# Patient Record
Sex: FEMALE | Race: WHITE | NOT HISPANIC OR LATINO | Employment: FULL TIME | URBAN - METROPOLITAN AREA
[De-identification: names, ages, dates, MRNs, and addresses within clinical notes are randomized per-mention and may not be internally consistent; named-entity substitution may affect disease eponyms.]

---

## 2017-02-20 ENCOUNTER — TELEPHONE (OUTPATIENT)
Dept: NEUROLOGY | Facility: CLINIC | Age: 52
End: 2017-02-20

## 2017-02-20 NOTE — TELEPHONE ENCOUNTER
Writer received message:  Caller: Annia     Relationship to Patient: Self     Call Back Number: [phone]     Reason for Call: Patient took her am medication and she is feeling auras. She was wondering if she should take additional medication or if she should wait to take her second dose. She would like a call back to discuss the matter. Thanks       Writer called and spoke to patient who indicates that she has started to have an aura. She also explains although she has not had an mensis in four months, she had one in feb and is this morning beginning to have small spotting.   She further indicates that this was the trigger for her auras for many years although she has been seizure free for a long time now.    Writer suggested that she take one of her 250 mg ER Depakote and cut that in half to expose the inside for immediate absorption and take it now.   Information forwarded to Dr. Hawkins for his assessment.    Juwan Sparks

## 2017-02-21 NOTE — TELEPHONE ENCOUNTER
Writer made phone call to patient to see how she is doing today, and she reports that she is doing very well and that there are no more issues at this time.    Juwan Sparks RN

## 2017-09-15 ENCOUNTER — OFFICE VISIT (OUTPATIENT)
Dept: NEUROLOGY | Facility: CLINIC | Age: 52
End: 2017-09-15

## 2017-09-15 VITALS
DIASTOLIC BLOOD PRESSURE: 80 MMHG | BODY MASS INDEX: 32.12 KG/M2 | SYSTOLIC BLOOD PRESSURE: 132 MMHG | WEIGHT: 192.8 LBS | HEART RATE: 110 BPM | HEIGHT: 65 IN

## 2017-09-15 DIAGNOSIS — G40.309 GENERALIZED CONVULSIVE EPILEPSY (H): Primary | ICD-10-CM

## 2017-09-15 LAB — VALPROATE SERPL-MCNC: 103 MG/L (ref 50–100)

## 2017-09-15 RX ORDER — DIVALPROEX SODIUM 250 MG
TABLET, EXTENDED RELEASE 24 HR ORAL
Qty: 360 TABLET | Refills: 4 | Status: SHIPPED | OUTPATIENT
Start: 2017-09-15 | End: 2018-08-28

## 2017-09-15 NOTE — PROGRESS NOTES
"   INTERVAL HX: No sz no side effects except rare tremor.  SEIZURE HISTORY REVIEWED 9/15/17:  She has been at [facility] since 2008.   Seizure onset at age 5.  She has had generalized tonic-clonic seizures and absence eye fluttering seizures.  In the past she has been treated with phenobarbital and Dilantin, but these were not very effective. Ever since we started VPA she has been sz free. Family history is that brother had seizures.    She has not had an MRI in our program.      EEG 08/2012 indicated frequent interictal activity, mostly generalized, and frequent brief episodes of eye flutter which were clinically not detected but were seen on video.  She continues to be right at the edge of having seizures.      She is still menstruating and has not gone into menopause yet.      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 Marriage is stable.  REVIEW OF SYSTEMS:  Except for L knee pain completely negative in 13 points.      EXAMINATION: Blood pressure 132/80, pulse 110, height 5' 4.96\" (165 cm), weight 192 lb 12.8 oz (87.5 kg), not currently breastfeeding.   She is cheerful, alert, well groomed and very polite.      Speech is very fluent and articulate with no difficulty with word finding.  Indeed, she even corrected my grammar at one point.      Extraocular movements are full.  Pupils react briskly.  Facial movements are symmetrical.  Hearing is intact.  Tongue and palate move normally.        Cerebellar:  There is mild tremor of outstretched hands.  Finger-nose-finger shows mild tremor, right more than left.  Tandem gait normal.        Motor tone, strength and coordination are all normal.   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  " Nevertheless her EEG is fairly typical. She failed generic in past; brand medically necessary. 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 She is still having probably some subclinical eye fluttering events, so we do not have room to lower her medication.      We had a long discussion regarding menopause, estrogen use, etc.     PLAN:        1.  Continue Brand Depakote 250   Two bid    2.  Return in 1 year.  3. VPA level and genotyping

## 2017-09-15 NOTE — LETTER
"9/15/2017       RE: Annia Eng  : 1965   MRN: [id number]      Dear Colleague,    Thank you for referring your patient, Annia Eng, to the [facility] EPILEPSY CARE at [facility]. Please see a copy of my visit note below.       INTERVAL HX: No sz no side effects except rare tremor.  SEIZURE HISTORY REVIEWED 9/15/17:  She has been at [facility] since .   Seizure onset at age 5.  She has had generalized tonic-clonic seizures and absence eye fluttering seizures.  In the past she has been treated with phenobarbital and Dilantin, but these were not very effective. Ever since we started VPA she has been sz free. Family history is that brother had seizures.    She has not had an MRI in our program.      EEG 2012 indicated frequent interictal activity, mostly generalized, and frequent brief episodes of eye flutter which were clinically not detected but were seen on video.  She continues to be right at the edge of having seizures.      She is still menstruating and has not gone into menopause yet.      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 Marriage is stable.  REVIEW OF SYSTEMS:  Except for L knee pain completely negative in 13 points.      EXAMINATION: Blood pressure 132/80, pulse 110, height 5' 4.96\" (165 cm), weight 192 lb 12.8 oz (87.5 kg), not currently breastfeeding.   She is cheerful, alert, well groomed and very polite.      Speech is very fluent and articulate with no difficulty with word finding.  Indeed, she even corrected my grammar at one point.      Extraocular movements are full.  Pupils react briskly.  Facial movements are symmetrical.  Hearing is intact.  Tongue and palate move normally.        Cerebellar:  There is mild tremor of outstretched hands.  Finger-nose-finger shows mild tremor, right more than left.  Tandem gait normal.        Motor tone, strength and coordination are all normal.   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  Nevertheless her EEG is fairly typical. She failed generic in past; brand medically necessary. 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 She is still having probably some subclinical eye fluttering events, so we do not have room to lower her medication.      We had a long discussion regarding menopause, estrogen use, etc.     PLAN:        1.  Continue Brand Depakote 250   Two bid    2.  Return in 1 year.  3. VPA level and genotyping         Jony Hawkins MD

## 2017-09-15 NOTE — MR AVS SNAPSHOT
"              After Visit Summary   9/15/2017    Annia Eng    MRN: [id number]           Patient Information     Date Of Birth          1965        Visit Information        Provider Department      9/15/2017 9:00 AM Jony Hawkins MD [facility]        Today's Diagnoses     Generalized convulsive epilepsy (H)    -  1       Follow-ups after your visit        Follow-up notes from your care team     Return in about 1 year (around 9/15/2018).      Who to contact     Please call your clinic at [phone] to:    Ask questions about your health    Make or cancel appointments    Discuss your medicines    Learn about your test results    Speak to your doctor   If you have compliments or concerns about an experience at your clinic, or if you wish to file a complaint, please contact [facility] Patient Relations at [phone] or email us at [email or web address]         Additional Information About Your Visit        MyChart Information     [employer] gives you secure access to your electronic health record. If you see a primary care provider, you can also send messages to your care team and make appointments. If you have questions, please call your primary care clinic.  If you do not have a primary care provider, please call [phone] and they will assist you.      [employer] is an electronic gateway that provides easy, online access to your medical records. With [employer], you can request a clinic appointment, read your test results, renew a prescription or communicate with your care team.     To access your existing account, please contact your [facility] or call [phone] for assistance.        Care EveryWhere ID     This is your Care EveryWhere ID. This could be used by other organizations to access your [place] medical records  [id number]        Your Vitals Were     Pulse Height BMI (Body Mass Index)             110 5' 4.96\" (165 cm) " 32.12 kg/m2          Blood Pressure from Last 3 Encounters:   09/15/17 132/80   10/14/16 144/90   10/05/15 142/67    Weight from Last 3 Encounters:   09/15/17 192 lb 12.8 oz (87.5 kg)   10/14/16 185 lb 3.2 oz (84 kg)   10/05/15 183 lb (83 kg)              We Performed the Following     Valproic Acid level          Where to get your medicines      These medications were sent to [place] [address] [facility] - [facility] [address]  [address] [address]     Phone:  [phone]     DEPAKOTE  MG 24 hr tablet          Primary Care Provider Office Phone # Fax #    Talita Joel [phone]       [facility] [address] [facility] [address]        Equal Access to Services     [facility]: Hadii fly rose hadasho Soomaali, waaxda luqadaha, qaybta kaalmada adeegyada, dagoberto kulkarni hayja trimble . So [facility] [phone].    ATENCIÓN: Si habla español, tiene a mccain disposición servicios gratuitos de asistencia lingüística. Matt al [phone].    We comply with applicable federal civil rights laws and [place] laws. We do not discriminate on the basis of race, color, national origin, age, disability sex, sexual orientation or gender identity.            Thank you!     Thank you for choosing [facility] EPILEPS[facility]  for your care. Our goal is always to provide you with excellent care. Hearing back from our patients is one way we can continue to improve our services. Please take a few minutes to complete the written survey that you may receive in the mail after your visit with us. Thank you!             Your Updated Medication List - Protect others around you: Learn how to safely use, store and throw away your medicines at www.disposemymeds.org.          This list is accurate as of: 9/15/17  9:09 AM.  Always use your most recent med list.                   Brand Name Dispense Instructions for use Diagnosis    DEPAKOTE  MG 24 hr tablet   Generic  drug:  divalproex     360 tablet    TAKE TWO TABLETS BY MOUTH TWICE DAILY    Generalized convulsive epilepsy (H)

## 2017-11-26 ENCOUNTER — HEALTH MAINTENANCE LETTER (OUTPATIENT)
Age: 52
End: 2017-11-26

## 2018-03-15 ENCOUNTER — TELEPHONE (OUTPATIENT)
Dept: NEUROLOGY | Facility: CLINIC | Age: 53
End: 2018-03-15

## 2018-03-26 NOTE — TELEPHONE ENCOUNTER
DMV form signed, faxed to [employer] on 03/26/2018, sent to scanning, and copy mailed to patient.     Vickie Bruno, CMA

## 2018-08-28 ENCOUNTER — OFFICE VISIT (OUTPATIENT)
Dept: NEUROLOGY | Facility: CLINIC | Age: 53
End: 2018-08-28
Payer: COMMERCIAL

## 2018-08-28 VITALS
WEIGHT: 194 LBS | BODY MASS INDEX: 32.32 KG/M2 | SYSTOLIC BLOOD PRESSURE: 162 MMHG | TEMPERATURE: 97.3 F | DIASTOLIC BLOOD PRESSURE: 104 MMHG | HEART RATE: 96 BPM

## 2018-08-28 DIAGNOSIS — G40.309 GENERALIZED CONVULSIVE EPILEPSY (H): ICD-10-CM

## 2018-08-28 RX ORDER — DIVALPROEX SODIUM 250 MG
TABLET, EXTENDED RELEASE 24 HR ORAL
Qty: 360 TABLET | Refills: 4 | Status: SHIPPED | OUTPATIENT
Start: 2018-08-28 | End: 2019-10-15

## 2018-08-28 ASSESSMENT — PAIN SCALES - GENERAL: PAINLEVEL: NO PAIN (0)

## 2018-08-28 NOTE — PROGRESS NOTES
INTERVAL HX: No sz since 2009; no side effects except rare tremor.  SEIZURE HISTORY REVIEWED 8/28/18:  She has been at [facility] since 2008.   Seizure onset at age 5.  She has had generalized tonic-clonic seizures and absence eye fluttering seizures.  In the past she has been treated with phenobarbital and Dilantin, but these were not very effective. Ever since we started VPA she has been sz free. Family history is that brother had seizures.    She has not had an MRI in our program.      EEG 08/2012 indicated frequent interictal activity, mostly generalized, and frequent brief episodes of eye flutter which were clinically not detected but were seen on video.  She continues to be right at the edge of having seizures.      She is still menstruating and has not gone into menopause yet.      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 Marriage is stable.  REVIEW OF SYSTEMS:  Except for L knee pain completely negative in 13 points.      EXAMINATION: Blood pressure (!) 162/104, pulse 96, temperature 97.3  F (36.3  C), weight 194 lb (88 kg), not currently breastfeeding.   She is cheerful, alert, well groomed and very polite.      Speech is very fluent and articulate with no difficulty with word finding.  Indeed, she even corrected my grammar at one point.      Extraocular movements are full.  Pupils react briskly.  Facial movements are symmetrical.  Hearing is intact.  Tongue and palate move normally.        Cerebellar:  There is  No tremor of outstretched hands.  Finger-nose-finger shows mild tremor, right more than left.  Tandem gait normal.        Motor tone, strength and coordination are all normal.   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  Nevertheless her EEG is fairly typical. She failed generic in past; brand medically necessary. 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  She may cherise knee surgery      The   decision here is the fact that she is at the border of good seizure control. She is still having probably some subclinical eye fluttering events, so we do not have room to lower her medication.        PLAN:        1.  Continue Brand Depakote 250   Two bid    2.  Return in 1 year.

## 2018-08-28 NOTE — LETTER
2018     RE: Annia Eng  : 1965   MRN: [id number]      Dear Colleague,    Thank you for referring your patient, Annia Eng, to the [facility] EPILEPSY CARE at [facility]. Please see a copy of my visit note below.     INTERVAL HX: No sz since ; no side effects except rare tremor.  SEIZURE HISTORY REVIEWED 18:  She has been at [facility] since .   Seizure onset at age 5.  She has had generalized tonic-clonic seizures and absence eye fluttering seizures.  In the past she has been treated with phenobarbital and Dilantin, but these were not very effective. Ever since we started VPA she has been sz free. Family history is that brother had seizures.    She has not had an MRI in our program.      EEG 2012 indicated frequent interictal activity, mostly generalized, and frequent brief episodes of eye flutter which were clinically not detected but were seen on video.  She continues to be right at the edge of having seizures.      She is still menstruating and has not gone into menopause yet.      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 Marriage is stable.  REVIEW OF SYSTEMS:  Except for L knee pain completely negative in 13 points.      EXAMINATION: Blood pressure (!) 162/104, pulse 96, temperature 97.3  F (36.3  C), weight 194 lb (88 kg), not currently breastfeeding.   She is cheerful, alert, well groomed and very polite.      Speech is very fluent and articulate with no difficulty with word finding.  Indeed, she even corrected my grammar at one point.      Extraocular movements are full.  Pupils react briskly.  Facial movements are symmetrical.  Hearing is intact.  Tongue and palate move normally.        Cerebellar:  There is  No tremor of outstretched hands.  Finger-nose-finger shows mild tremor, right more than left.  Tandem gait normal.        Motor tone, strength and coordination are all normal.    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  Nevertheless her EEG is fairly typical. She failed generic in past; brand medically necessary. 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  She may cherise knee surgery      The   decision here is the fact that she is at the border of good seizure control. She is still having probably some subclinical eye fluttering events, so we do not have room to lower her medication.        PLAN:        1.  Continue Brand Depakote 250   Two bid    2.  Return in 1 year.     Again, thank you for allowing me to participate in the care of your patient.      Sincerely,    Jony Hawkins MD

## 2018-08-28 NOTE — MR AVS SNAPSHOT
After Visit Summary   8/28/2018    Annia Eng    MRN: [id number]           Patient Information     Date Of Birth          1965        Visit Information        Provider Department      8/28/2018 3:30 PM Jony Hawkins MD [facility]         Follow-ups after your visit        Who to contact     Please call your clinic at [phone] to:    Ask questions about your health    Make or cancel appointments    Discuss your medicines    Learn about your test results    Speak to your doctor            Additional Information About Your Visit        MyChart Information     [employer] gives you secure access to your electronic health record. If you see a primary care provider, you can also send messages to your care team and make appointments. If you have questions, please call your primary care clinic.  If you do not have a primary care provider, please call [phone] and they will assist you.      [employer] is an electronic gateway that provides easy, online access to your medical records. With [employer], you can request a clinic appointment, read your test results, renew a prescription or communicate with your care team.     To access your existing account, please contact your [facility] or call [phone] for assistance.        Care EveryWhere ID     This is your Care EveryWhere ID. This could be used by other organizations to access your [place] medical records  [id number]        Your Vitals Were     Pulse Temperature BMI (Body Mass Index)             96 97.3  F (36.3  C) 32.32 kg/m2          Blood Pressure from Last 3 Encounters:   08/28/18 (!) 162/104   09/15/17 132/80   10/14/16 144/90    Weight from Last 3 Encounters:   08/28/18 194 lb (88 kg)   09/15/17 192 lb 12.8 oz (87.5 kg)   10/14/16 185 lb 3.2 oz (84 kg)              Today, you had the following     No orders found for display       Primary Care Provider Office Phone # Fax #    Talita Joel MD  [phone]       [facility] [address]  [facility] [address]        Equal Access to Services     JUDITH SMART : Hadii aad ku hadchandrikacoretta Teressadanny, bijanda catrinajoselineha, ritata kasallyda sathish, dagoberto jarquinphyllis bernie. So [facility] [place] [phone].    ATENCIÓN: Si habla español, tiene a mccain disposición servicios gratuitos de asistencia lingüística. Llame al [phone].    We comply with applicable federal civil rights laws and [place] laws. We do not discriminate on the basis of race, color, national origin, age, disability, sex, sexual orientation, or gender identity.            Thank you!     Thank you for choosing [facility] EPILEPSY [facility]  for your care. Our goal is always to provide you with excellent care. Hearing back from our patients is one way we can continue to improve our services. Please take a few minutes to complete the written survey that you may receive in the mail after your visit with us. Thank you!             Your Updated Medication List - Protect others around you: Learn how to safely use, store and throw away your medicines at www.disposemymeds.org.          This list is accurate as of 8/28/18  3:56 PM.  Always use your most recent med list.                   Brand Name Dispense Instructions for use Diagnosis    DEPAKOTE  MG 24 hr tablet   Generic drug:  divalproex sodium extended-release     360 tablet    TAKE TWO TABLETS BY MOUTH TWICE DAILY    Generalized convulsive epilepsy (H)

## 2019-03-11 ENCOUNTER — TELEPHONE (OUTPATIENT)
Dept: NEUROLOGY | Facility: CLINIC | Age: 54
End: 2019-03-11

## 2019-03-11 NOTE — TELEPHONE ENCOUNTER
Patient called in again.    Previously, patient had been told she is a slow metabolizer of medications, and she was concerned that would affect the instructions.    I reassured her that the instructions are still okay.    Encouraged to call If further questions or concerns

## 2019-03-11 NOTE — TELEPHONE ENCOUNTER
Patient called in because she noticed this morning that she had missed last nights dose of depakote ER    I confirmed with the patient that she is taking no other long term meds, she noted she is taking a Z-Owen for pertussis that is last dose today.    She has had no seizures and no problem with taking medications while sick.    Patient took her usual meds this morning.    Discussed missed dose instructions with the patient. She will take the missed meds with lunch today.  She was advised that she likely would not experience any side effects as she was making up missed doses, however the patient will be at home (she is not supposed to be out of the house until the course of abx for pertussis is complete) so if dizziness occurs she will be okay to rest.    No other questions at this time    Instructed to call if questions or concerns arise

## 2019-03-25 ENCOUNTER — TELEPHONE (OUTPATIENT)
Dept: NEUROLOGY | Facility: CLINIC | Age: 54
End: 2019-03-25

## 2019-03-25 NOTE — TELEPHONE ENCOUNTER
Patient called into clinic and was transferred to this nurse.  Patient indicates she take Depakote  mg BID ( 6am and 9pm ).  She just came home and realized she missed her AM dose and is asking what to do.    This nurse advised her to take missed dose now and to take her usual Pm dose at her usual time.    Patient is in agreement.

## 2019-10-15 ENCOUNTER — OFFICE VISIT (OUTPATIENT)
Dept: NEUROLOGY | Facility: CLINIC | Age: 54
End: 2019-10-15
Payer: COMMERCIAL

## 2019-10-15 DIAGNOSIS — G40.309 GENERALIZED CONVULSIVE EPILEPSY (H): ICD-10-CM

## 2019-10-15 RX ORDER — DIVALPROEX SODIUM 250 MG
TABLET, EXTENDED RELEASE 24 HR ORAL
Qty: 360 TABLET | Refills: 4 | Status: SHIPPED | OUTPATIENT
Start: 2019-10-15 | End: 2020-11-10

## 2019-10-15 NOTE — PROGRESS NOTES
INTERVAL HX: No sz since 2009; no side effects.    SEIZURE HISTORY REVIEWED 10/15/19:  She has been at [facility] since 2008.   Seizure onset at age 5.  She has had generalized tonic-clonic seizures and absence eye fluttering seizures.  In the past she has been treated with phenobarbital and Dilantin, but these were not very effective. Ever since we started VPA she has been sz free. Family history is that brother had seizures.    She has not had an MRI in our program.      EEG 08/2012 indicated frequent interictal activity, mostly generalized, and frequent brief episodes of eye flutter which were clinically not detected but were seen on video.  She continues to be right at the edge of having seizures.      She is not  menstruating and has  gone into menopause for about 1 year.     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 Marriage is stable.  REVIEW OF SYSTEMS:  Except for L knee pain completely negative in 13 points.      EXAMINATION: not currently breastfeeding.   She is cheerful, alert, well groomed and very polite.      Speech is very fluent and articulate with no difficulty with word finding.  Indeed, she even corrected my grammar at one point.      Extraocular movements are full.  Pupils react briskly.  Facial movements are symmetrical.  Hearing is intact.  Tongue and palate move normally.        Cerebellar:  There is  No tremor of outstretched hands.  Finger-nose-finger shows mild tremor, right more than left.  Tandem gait normal.        Motor tone, strength and coordination are all normal.   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  Nevertheless her EEG is fairly typical. She failed generic in past; brand medically  necessary. 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  She may cherise knee surgery      If EEG is normal next year, will consided taper & discont VPA       PLAN:        1.  Continue Brand Depakote 250   Two bid    2.  Return in 1 year. With EEG

## 2019-10-15 NOTE — LETTER
10/15/2019     RE: Annia Eng  : 1965   MRN: [id number]      Dear Colleague,    Thank you for referring your patient, Annia Eng, to the [facility] EPILEPSY CARE at [facility]. Please see a copy of my visit note below.     INTERVAL HX: No sz since ; no side effects.    SEIZURE HISTORY REVIEWED 10/15/19:  She has been at [facility] since .   Seizure onset at age 5.  She has had generalized tonic-clonic seizures and absence eye fluttering seizures.  In the past she has been treated with phenobarbital and Dilantin, but these were not very effective. Ever since we started VPA she has been sz free. Family history is that brother had seizures.    She has not had an MRI in our program.      EEG 2012 indicated frequent interictal activity, mostly generalized, and frequent brief episodes of eye flutter which were clinically not detected but were seen on video.  She continues to be right at the edge of having seizures.      She is not  menstruating and has  gone into menopause for about 1 year.     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 Marriage is stable.  REVIEW OF SYSTEMS:  Except for L knee pain completely negative in 13 points.      EXAMINATION: not currently breastfeeding.   She is cheerful, alert, well groomed and very polite.      Speech is very fluent and articulate with no difficulty with word finding.  Indeed, she even corrected my grammar at one point.      Extraocular movements are full.  Pupils react briskly.  Facial movements are symmetrical.  Hearing is intact.  Tongue and palate move normally.        Cerebellar:  There is  No tremor of outstretched hands.  Finger-nose-finger shows mild tremor, right more than left.  Tandem gait normal.        Motor tone, strength and coordination are all normal.   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  Nevertheless her EEG is fairly typical. She failed generic in past; brand medically necessary. 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  She may cherise knee surgery      If EEG is normal next year, will consided taper & discont VPA     PLAN:      1.  Continue Brand Depakote 250   Two bid    2.  Return in 1 year. With EEG     Again, thank you for allowing me to participate in the care of your patient.      Sincerely,    Jony Hawkins MD

## 2019-10-16 VITALS
SYSTOLIC BLOOD PRESSURE: 146 MMHG | BODY MASS INDEX: 31.86 KG/M2 | HEIGHT: 65 IN | DIASTOLIC BLOOD PRESSURE: 91 MMHG | WEIGHT: 191.2 LBS

## 2019-10-16 ASSESSMENT — MIFFLIN-ST. JEOR: SCORE: 1468.16

## 2019-10-16 ASSESSMENT — PAIN SCALES - GENERAL: PAINLEVEL: NO PAIN (0)

## 2020-03-02 ENCOUNTER — HEALTH MAINTENANCE LETTER (OUTPATIENT)
Age: 55
End: 2020-03-02

## 2020-05-07 ENCOUNTER — TELEPHONE (OUTPATIENT)
Dept: NEUROLOGY | Facility: CLINIC | Age: 55
End: 2020-05-07

## 2020-05-13 NOTE — TELEPHONE ENCOUNTER
Seizure/loss of consciousness form printed, completion initiated, pending provider review for completion/signature.

## 2020-05-14 NOTE — TELEPHONE ENCOUNTER
[employer] form completed mailed and fax to [employer],form was fax to [facility] and placed in form folder.

## 2020-11-06 DIAGNOSIS — G40.309 GENERALIZED CONVULSIVE EPILEPSY (H): ICD-10-CM

## 2020-11-10 ENCOUNTER — VIRTUAL VISIT (OUTPATIENT)
Dept: NEUROLOGY | Facility: CLINIC | Age: 55
End: 2020-11-10
Payer: COMMERCIAL

## 2020-11-10 DIAGNOSIS — G40.309 GENERALIZED CONVULSIVE EPILEPSY (H): ICD-10-CM

## 2020-11-10 RX ORDER — DIVALPROEX SODIUM 250 MG
TABLET, EXTENDED RELEASE 24 HR ORAL
Qty: 360 TABLET | Refills: 4 | Status: SHIPPED | OUTPATIENT
Start: 2020-11-10 | End: 2021-12-31

## 2020-11-10 SDOH — HEALTH STABILITY: MENTAL HEALTH: HOW OFTEN DO YOU HAVE A DRINK CONTAINING ALCOHOL?: NEVER

## 2020-11-10 NOTE — TELEPHONE ENCOUNTER
DEPAKOTE  MG 24 hr tablet  Last Written Prescription Date:  10/15/19  Last Fill Quantity: 360,   # refills: 4  Last Office Visit : 10/15/19  Future Office visit:  11/10/20    aed   9/17  > 26 mths  ast  8/14  > 26 mths  Platelets not found    Routing refill request to provider for review/approval because: appt today, routed pending any med change. Labs past due

## 2020-11-10 NOTE — PROGRESS NOTES
Annia Eng is a 54 year old female who is being evaluated via a billable video visit.       INTERVAL HX: No sz since 2009; no side effects.    SEIZURE HISTORY REVIEWED 11/10/20:  She has been at [facility] since 2008.   Seizure onset at age 5.  She has had generalized tonic-clonic seizures and absence eye fluttering seizures.  In the past she has been treated with phenobarbital and Dilantin, but these were not very effective. Ever since we started VPA she has been sz free. Family history is that brother had seizures.    She has not had an MRI in our program.      EEG 08/2012 indicated frequent interictal activity, mostly generalized, and frequent brief episodes of eye flutter which were clinically not detected but were seen on video.  She continues to be right at the edge of having seizures.      She is not  menstruating and has  gone into menopause for about 1 year.     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 Marriage is stable.  REVIEW OF SYSTEMS:  Except for L knee pain completely negative in 13 points.      EXAMINATION:    She is cheerful, alert, well groomed and very polite.      Speech is very fluent and articulate with no difficulty with word finding.  Indeed, she even corrected my grammar at one point.      Extraocular movements are full.  Facial movements are symmetrical.  Hearing is intact.  Tongue and palate move normally.     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  Nevertheless her EEG is fairly typical. She failed generic in past; brand medically necessary. 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  She may cherise knee surgery      If EEG is normal next year, will consided taper & discont VPA       PLAN:        1.  Continue Brand Depakote 250    "Two bid    2.  Return in 10 mo. With EEG       The patient has been notified of following:     \"This video visit will be conducted via a call between you and your physician/provider. We have found that certain health care needs can be provided without the need for an in-person physical exam.  This service lets us provide the care you need with a video conversation.  If a prescription is necessary we can send it directly to your pharmacy.  If lab work is needed we can place an order for that and you can then stop by our lab to have the test done at a later time.    Video visits are billed at different rates depending on your insurance coverage.  Please reach out to your insurance provider with any questions.    If during the course of the call the physician/provider feels a video visit is not appropriate, you will not be charged for this service.\"    Patient has given verbal consent for Video visit? Yes  How would you like to obtain your AVS? MyChart  If you are dropped from the video visit, the video invite should be resent to: Send to e-mail at: [email or web address]  Will anyone else be joining your video visit? No        Video-Visit Details    Type of service:  Video Visit    Video Start Time:3:59  Video End Time:4:13  Originating Location (pt. Location)HOME  Distant Location (provider location):  [facility] EPILEPSY CARE     Platform used for Video Visit: Irma Hawkins MD        "

## 2020-11-10 NOTE — LETTER
11/10/2020       RE: Annia Eng  : 1965   MRN: [id number]      Dear Colleague,    Thank you for referring your patient, Annia Eng, to the [facility] EPILEPSY CARE at [facility]. Please see a copy of my visit note below.    Annia Eng is a 54 year old female who is being evaluated via a billable video visit.       INTERVAL HX: No sz since ; no side effects.    SEIZURE HISTORY REVIEWED 11/10/20:  She has been at [facility] since .   Seizure onset at age 5.  She has had generalized tonic-clonic seizures and absence eye fluttering seizures.  In the past she has been treated with phenobarbital and Dilantin, but these were not very effective. Ever since we started VPA she has been sz free. Family history is that brother had seizures.    She has not had an MRI in our program.      EEG 2012 indicated frequent interictal activity, mostly generalized, and frequent brief episodes of eye flutter which were clinically not detected but were seen on video.  She continues to be right at the edge of having seizures.      She is not  menstruating and has  gone into menopause for about 1 year.     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 Marriage is stable.  REVIEW OF SYSTEMS:  Except for L knee pain completely negative in 13 points.      EXAMINATION:    She is cheerful, alert, well groomed and very polite.      Speech is very fluent and articulate with no difficulty with word finding.  Indeed, she even corrected my grammar at one point.      Extraocular movements are full.  Facial movements are symmetrical.  Hearing is intact.  Tongue and palate move normally.     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   "Nevertheless her EEG is fairly typical. She failed generic in past; brand medically necessary. 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  She may cherise knee surgery      If EEG is normal next year, will consided taper & discont VPA       PLAN:        1.  Continue Brand Depakote 250   Two bid    2.  Return in 10 mo. With EEG       The patient has been notified of following:     \"This video visit will be conducted via a call between you and your physician/provider. We have found that certain health care needs can be provided without the need for an in-person physical exam.  This service lets us provide the care you need with a video conversation.  If a prescription is necessary we can send it directly to your pharmacy.  If lab work is needed we can place an order for that and you can then stop by our lab to have the test done at a later time.    Video visits are billed at different rates depending on your insurance coverage.  Please reach out to your insurance provider with any questions.    If during the course of the call the physician/provider feels a video visit is not appropriate, you will not be charged for this service.\"    Patient has given verbal consent for Video visit? Yes  How would you like to obtain your AVS? MyChart  If you are dropped from the video visit, the video invite should be resent to: Send to e-mail at: [email or web address]  Will anyone else be joining your video visit? No        Video-Visit Details    Type of service:  Video Visit    Video Start Time:3:59  Video End Time:4:13  Originating Location (pt. Location)HOME  Distant Location (provider location):  [facility] EPILEPSY CARE     Platform used for Video Visit: Irma Hawkins MD      "

## 2020-11-11 RX ORDER — DIVALPROEX SODIUM 250 MG
TABLET, EXTENDED RELEASE 24 HR ORAL
Qty: 360 TABLET | Refills: 3 | Status: SHIPPED | OUTPATIENT
Start: 2020-11-11 | End: 2021-12-31

## 2021-03-15 ENCOUNTER — TELEPHONE (OUTPATIENT)
Dept: NEUROLOGY | Facility: CLINIC | Age: 56
End: 2021-03-15

## 2021-03-15 NOTE — TELEPHONE ENCOUNTER
Brand name depakote er 250 MG   [facility] [phone]     Prior Authorization Retail Medication Request    Medication/Dose: Depakote  MG  ICD code (if different than what is on RX):  See chart  Previously Tried and Failed:  See chart  Rationale:  See chart    Insurance Name:  See chart  Insurance ID:  See chart      Pharmacy Information (if different than what is on RX)  Name:  See chart  Phone:  See chart

## 2021-03-16 NOTE — TELEPHONE ENCOUNTER
Central Prior Authorization Team   Phone: [phone]      PA Initiation    Medication: DEPAKOTE  MG 24 hr tablet (DENISE)  Insurance Company: [employer] - Phone [phone] Fax [phone]  Pharm[facility] Filling the [id number] [place], [place] - [address]  Filling Pharmacy Phone: [phone]  Filling Pharmacy Fax:    Start Date: 3/16/2021

## 2021-03-23 NOTE — TELEPHONE ENCOUNTER
Prior Authorization Not Needed per Insurance.  Patient picked up a 3mos supply on 02/09/21, is too soon to fill.          Medication: DEPAKOTE  MG 24 hr tablet (DENISE)  Insurance Company: [employer] - Phone [phone] Fax [phone]  Expected CoPay:      Ph[id number] [place] [address]  Pharmacy Notified: Yes - spoke to male staff who says Patient picked up a 3mos supply on 02/09/21, is too soon to fill.  Patient Notified: No

## 2021-04-18 ENCOUNTER — HEALTH MAINTENANCE LETTER (OUTPATIENT)
Age: 56
End: 2021-04-18

## 2021-06-16 ENCOUNTER — TELEPHONE (OUTPATIENT)
Dept: NEUROLOGY | Facility: CLINIC | Age: 56
End: 2021-06-16

## 2021-10-03 ENCOUNTER — HEALTH MAINTENANCE LETTER (OUTPATIENT)
Age: 56
End: 2021-10-03

## 2021-12-31 ENCOUNTER — OFFICE VISIT (OUTPATIENT)
Dept: NEUROLOGY | Facility: CLINIC | Age: 56
End: 2021-12-31

## 2021-12-31 VITALS
DIASTOLIC BLOOD PRESSURE: 93 MMHG | BODY MASS INDEX: 33.51 KG/M2 | TEMPERATURE: 96.9 F | SYSTOLIC BLOOD PRESSURE: 141 MMHG | HEART RATE: 101 BPM | WEIGHT: 201.4 LBS

## 2021-12-31 DIAGNOSIS — G40.309 GENERALIZED CONVULSIVE EPILEPSY (H): Primary | ICD-10-CM

## 2021-12-31 RX ORDER — LORATADINE 10 MG/1
10 TABLET ORAL DAILY PRN
COMMUNITY

## 2021-12-31 RX ORDER — DIVALPROEX SODIUM 250 MG
TABLET, EXTENDED RELEASE 24 HR ORAL
Qty: 360 TABLET | Refills: 3 | Status: SHIPPED | OUTPATIENT
Start: 2021-12-31 | End: 2022-11-01

## 2021-12-31 NOTE — LETTER
2021     RE: Annia Eng  : 1965   MRN: [id number]      Dear Colleague,    Thank you for referring your patient, Annia Eng, to the [facility] EPILEPSY CARE at [facility]. Please see a copy of my visit note below.    Annia Eng is a 54 year old female who is being evaluated in clinic      INTERVAL HX: No sz since ; no side effects.    SEIZURE HISTORY REVIEWED 11/10/20:  She has been at [facility] since .   Seizure onset at age 5.  She has had generalized tonic-clonic seizures and absence eye fluttering seizures.  In the past she has been treated with phenobarbital and Dilantin, but these were not very effective. Ever since we started VPA she has been sz free. Family history is that brother had seizures.    She has not had an MRI in our program.      EEG 2012 indicated frequent interictal activity, mostly generalized, and frequent brief episodes of eye flutter which were clinically not detected but were seen on video.  She continues to be right at the edge of having seizures.      She is not  menstruating and has  gone into menopause for about 1 year.     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 Marriage is stable.  REVIEW OF SYSTEMS:  Except for L knee pain completely negative in 13 points.      EXAMINATION:    She is cheerful, alert, well groomed and very polite.      Speech is very fluent and articulate with no difficulty with word finding.  Indeed, she even corrected my grammar at one point.      Extraocular movements are full.  Facial movements are symmetrical.  Hearing is intact.  Tongue and palate move normally.     Minimal tremor left hand.  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  Nevertheless her EEG is fairly typical. She failed generic in past; brand medically necessary. 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        If EEG is normal next year, will consided taper & discont VPA       PLAN:        1.  Continue Brand Depakote 250   Two bid    2.  Return in 10 mo. With EEG     Time:  4 min pre visit charting and results review; face to face 24 min; post visit charting 3 min.    Again, thank you for allowing me to participate in the care of your patient.      Sincerely,    Jony Hawkins MD

## 2021-12-31 NOTE — PROGRESS NOTES
Annia Eng is a 54 year old female who is being evaluated in clinic      INTERVAL HX: No sz since 2009; no side effects.    SEIZURE HISTORY REVIEWED 11/10/20:  She has been at [facility] since 2008.   Seizure onset at age 5.  She has had generalized tonic-clonic seizures and absence eye fluttering seizures.  In the past she has been treated with phenobarbital and Dilantin, but these were not very effective. Ever since we started VPA she has been sz free. Family history is that brother had seizures.    She has not had an MRI in our program.      EEG 08/2012 indicated frequent interictal activity, mostly generalized, and frequent brief episodes of eye flutter which were clinically not detected but were seen on video.  She continues to be right at the edge of having seizures.      She is not  menstruating and has  gone into menopause for about 1 year.     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 Marriage is stable.  REVIEW OF SYSTEMS:  Except for L knee pain completely negative in 13 points.      EXAMINATION:    She is cheerful, alert, well groomed and very polite.      Speech is very fluent and articulate with no difficulty with word finding.  Indeed, she even corrected my grammar at one point.      Extraocular movements are full.  Facial movements are symmetrical.  Hearing is intact.  Tongue and palate move normally.     Minimal tremor left hand.  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  Nevertheless her EEG is fairly typical. She failed generic in past; brand medically necessary. 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        If EEG is normal next year, will consided taper & discont VPA       PLAN:        1.   Continue Brand Depakote 250   Two bid    2.  Return in 10 mo. With EEG     Time:  4 min pre visit charting and results review; face to face 24 min; post visit charting 3 min.

## 2022-05-15 ENCOUNTER — HEALTH MAINTENANCE LETTER (OUTPATIENT)
Age: 57
End: 2022-05-15

## 2022-09-04 ENCOUNTER — HEALTH MAINTENANCE LETTER (OUTPATIENT)
Age: 57
End: 2022-09-04

## 2022-11-01 ENCOUNTER — OFFICE VISIT (OUTPATIENT)
Dept: NEUROLOGY | Facility: CLINIC | Age: 57
End: 2022-11-01
Payer: COMMERCIAL

## 2022-11-01 ENCOUNTER — ANCILLARY PROCEDURE (OUTPATIENT)
Dept: NEUROLOGY | Facility: CLINIC | Age: 57
End: 2022-11-01
Attending: PSYCHIATRY & NEUROLOGY
Payer: COMMERCIAL

## 2022-11-01 ENCOUNTER — LAB (OUTPATIENT)
Dept: LAB | Facility: CLINIC | Age: 57
End: 2022-11-01
Payer: COMMERCIAL

## 2022-11-01 VITALS
WEIGHT: 195.2 LBS | HEIGHT: 65 IN | DIASTOLIC BLOOD PRESSURE: 93 MMHG | HEART RATE: 97 BPM | SYSTOLIC BLOOD PRESSURE: 136 MMHG | BODY MASS INDEX: 32.52 KG/M2 | TEMPERATURE: 97.5 F

## 2022-11-01 DIAGNOSIS — G40.309 GENERALIZED CONVULSIVE EPILEPSY (H): ICD-10-CM

## 2022-11-01 DIAGNOSIS — G40.309 GENERALIZED CONVULSIVE EPILEPSY (H): Primary | ICD-10-CM

## 2022-11-01 PROCEDURE — 99000 SPECIMEN HANDLING OFFICE-LAB: CPT

## 2022-11-01 PROCEDURE — 36415 COLL VENOUS BLD VENIPUNCTURE: CPT

## 2022-11-01 PROCEDURE — 80164 ASSAY DIPROPYLACETIC ACD TOT: CPT | Mod: 90

## 2022-11-01 RX ORDER — DIVALPROEX SODIUM 250 MG
TABLET, EXTENDED RELEASE 24 HR ORAL
Qty: 270 TABLET | Refills: 3 | Status: SHIPPED | OUTPATIENT
Start: 2022-11-01 | End: 2022-11-15

## 2022-11-01 NOTE — LETTER
2022      RE: Annia Eng  : 1965   MRN: [id number]      Dear Colleague,    Thank you for referring your patient, Annia Eng, to the [facility] EPILEPSY CARE at [facility]. Please see a copy of my visit note below.    Annia Eng is a 54 year old female who is being evaluated in clinic      INTERVAL HX: No sz since ; no side effects. EEG done today.    SEIZURE HISTORY REVIEWED 22:  She has been at [facility] since .   Seizure onset at age 5.  She has had generalized tonic-clonic seizures and absence eye fluttering seizures.  In the past she has been treated with phenobarbital and Dilantin, but these were not very effective. Ever since we started VPA she has been sz free. Family history is that brother had seizures.    She has not had an MRI in our program.      EEG 2012 indicated frequent interictal activity, mostly generalized, and frequent brief episodes of eye flutter which were clinically not detected but were seen on video.  She continues to be right at the edge of having seizures.     EEG 22- background normal but shows generalized polyspikes and waves of less than 2 sec.     She is in menopause .     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 Marriage is stable.  REVIEW OF SYSTEMS:  Except for L knee pain completely negative in 13 points.      EXAMINATION:    She is cheerful, alert, well groomed and very polite.      Speech is very fluent and articulate with no difficulty with word finding.  Indeed, she even corrected my grammar at one point.      Extraocular movements are full.  Facial movements are symmetrical.  Hearing is intact.  Tongue and palate move normally.     Minimal tremor of hands.  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 She  failed generic in past; brand medically necessary. 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 Shr nrrds to continue VPA but her level was clibing and was over 100 last check. Will decrease VPA to 250-500.          PLAN:      1.  Continue Brand Depakote 250    1 Am 2 PM  2.  Return in 10 mo.      Time:  5 min pre visit   results review; face to face 25  min discussing EEG, VPA level and plan to lower VPA; post visit charting 3 min.    Again, thank you for allowing me to participate in the care of your patient.      Sincerely,    Jony Hawkins MD

## 2022-11-02 NOTE — PROGRESS NOTES
Annia Eng is a 54 year old female who is being evaluated in clinic      INTERVAL HX: No sz since 2009; no side effects. EEG done today.    SEIZURE HISTORY REVIEWED 11/1/22:  She has been at [facility] since 2008.   Seizure onset at age 5.  She has had generalized tonic-clonic seizures and absence eye fluttering seizures.  In the past she has been treated with phenobarbital and Dilantin, but these were not very effective. Ever since we started VPA she has been sz free. Family history is that brother had seizures.    She has not had an MRI in our program.      EEG 08/2012 indicated frequent interictal activity, mostly generalized, and frequent brief episodes of eye flutter which were clinically not detected but were seen on video.  She continues to be right at the edge of having seizures.     EEG 11/1/22- background normal but shows generalized polyspikes and waves of less than 2 sec.     She is in menopause .     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 Marriage is stable.  REVIEW OF SYSTEMS:  Except for L knee pain completely negative in 13 points.      EXAMINATION:    She is cheerful, alert, well groomed and very polite.      Speech is very fluent and articulate with no difficulty with word finding.  Indeed, she even corrected my grammar at one point.      Extraocular movements are full.  Facial movements are symmetrical.  Hearing is intact.  Tongue and palate move normally.     Minimal tremor of hands.  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 She failed generic in past; brand medically necessary. 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 [facility] nrrds to continue VPA but her level was clibing and was over 100 last check. Will decrease VPA to 250-500.               PLAN:        1.  Continue Brand Depakote 250    1 Am 2 PM  2.  Return in 10  mo.      Time:  5 min pre visit   results review; face to face 25  min discussing EEG, VPA level and plan to lower VPA; post visit charting 3 min.

## 2022-11-04 LAB
VALPROATE FREE MFR SERPL: 20 %
VALPROATE FREE SERPL-MCNC: 18 UG/ML
VALPROATE SERPL-MCNC: 88 UG/ML

## 2022-11-10 ENCOUNTER — TELEPHONE (OUTPATIENT)
Dept: NEUROLOGY | Facility: CLINIC | Age: 57
End: 2022-11-10

## 2022-11-10 DIAGNOSIS — G40.309 GENERALIZED CONVULSIVE EPILEPSY (H): ICD-10-CM

## 2022-11-10 NOTE — TELEPHONE ENCOUNTER
What is the concern that needs to be addressed by a nurse?     Annia CHUA Velasquez is calling requesting a call back. Patient says that per Dr. Hawkins she decreased DEPAKOTE  MG 24 hr tablet to 3 tablets per day. Patient states that she noticed increased eye fluttering with that change, and (as discussed with provider) increased her dosage back to 4 tablets per day. Patient is requesting a call back to discuss further.      May a detailed message be left on voicemail? YES    Date of last office visit: 11/01/22    Message routed to: MINCEP RN

## 2022-11-11 NOTE — TELEPHONE ENCOUNTER
Chart reviewed.  At the visit 11/1/22 Depakote was reduced.  Level from 11/1/22 is :-   Latest Reference Range & Units 11/01/22 15:11   Valproic Acid Free 7 - 23 ug/mL 18   Valproic Acid Total 50 - 125 ug/mL 88   Valproic Acid % Free 5 - 18 % 20 (H)   (H): Data is abnormally high    Call placed to patient.  Voice message left with call back number

## 2022-11-15 RX ORDER — DIVALPROEX SODIUM 250 MG
500 TABLET, EXTENDED RELEASE 24 HR ORAL 2 TIMES DAILY
Qty: 360 TABLET | Refills: 3 | Status: SHIPPED | OUTPATIENT
Start: 2022-11-15 | End: 2024-01-09

## 2022-11-15 NOTE — TELEPHONE ENCOUNTER
Call returned by patient.    Patient was instructed by  to return to the old dose if there were any concerns   Dose was reduced on Saturday 5th Nov.  Patient noticed eye fluttering starting on the Tuesday  Dose was returned to 4 tabs on Thursday 10th  Now she is back on 4 tabs per day, the eye fluttering has stopped.    She will need a new prescription sending with the updated dose.  Will update .

## 2023-06-03 ENCOUNTER — HEALTH MAINTENANCE LETTER (OUTPATIENT)
Age: 58
End: 2023-06-03

## 2023-07-23 ENCOUNTER — HEALTH MAINTENANCE LETTER (OUTPATIENT)
Age: 58
End: 2023-07-23

## 2023-10-24 ENCOUNTER — OFFICE VISIT (OUTPATIENT)
Dept: NEUROLOGY | Facility: CLINIC | Age: 58
End: 2023-10-24
Payer: COMMERCIAL

## 2023-10-24 VITALS
WEIGHT: 196 LBS | BODY MASS INDEX: 32.62 KG/M2 | HEART RATE: 102 BPM | DIASTOLIC BLOOD PRESSURE: 84 MMHG | TEMPERATURE: 97 F | OXYGEN SATURATION: 97 % | SYSTOLIC BLOOD PRESSURE: 137 MMHG

## 2023-10-24 DIAGNOSIS — G40.309 GENERALIZED CONVULSIVE EPILEPSY (H): Primary | ICD-10-CM

## 2023-10-24 ASSESSMENT — PAIN SCALES - GENERAL: PAINLEVEL: NO PAIN (0)

## 2023-10-24 NOTE — LETTER
10/24/2023       RE: Annia Eng  : 1965   MRN: [id number]      Dear Colleague,    Thank you for referring your patient, Annia Eng, to the [facility] EPILEPSY CARE at [facility]. Please see a copy of my visit note below.    Annia Eng is a 54 year old female who is being evaluated in clinic      INTERVAL HX: No sz since ; no side effects. EEG done today.    SEIZURE HISTORY REVIEWED 22:  She has been at [facility] since .   Seizure onset at age 5.  She has had generalized tonic-clonic seizures and absence eye fluttering seizures.  In the past she has been treated with phenobarbital and Dilantin, but these were not very effective. Ever since we started VPA she has been sz free. Family history is that brother had seizures.    She has not had an MRI in our program.      EEG 2012 indicated frequent interictal activity, mostly generalized, and frequent brief episodes of eye flutter which were clinically not detected but were seen on video.  She continues to be right at the edge of having seizures.     EEG 22- background normal but shows generalized polyspikes and waves of less than 2 sec.     She is in menopause .     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 Marriage is stable.  REVIEW OF SYSTEMS:  Except for L knee pain completely negative in 13 points.      EXAMINATION:    She is cheerful, alert, well groomed and very polite.      Speech is very fluent and articulate with no difficulty with word finding.  Indeed, she even corrected my grammar at one point.      Extraocular movements are full.  Facial movements are symmetrical.  Hearing is intact.  Tongue and palate move normally.     Minimal tremor of hands.  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 She failed generic in past; brand medically necessary. 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 She needs to continue VPA but her level was clibing and was over 100 last check. Recent level 11 Oct 23 was 80. Will continue VPA to 250- two bid. Her personal therapeutic range is 80 micrograms/ml total          PLAN:        1.  Continue Brand Depakote 250    1 Am 2 PM  2.  Return in 10 mo.      Time:  5 min pre visit   results review; face to face 24  min discussing EEG, personal VPA level and plan to lower VPA in future as she gets older to keep level in 80s range; post visit charting 3 min.          Again, thank you for allowing me to participate in the care of your patient.      Sincerely,    Jony Hawkins MD

## 2023-10-24 NOTE — PROGRESS NOTES
Annia Eng is a 54 year old female who is being evaluated in clinic      INTERVAL HX: No sz since 2009; no side effects. EEG done today.    SEIZURE HISTORY REVIEWED 11/1/22:  She has been at [facility] since 2008.   Seizure onset at age 5.  She has had generalized tonic-clonic seizures and absence eye fluttering seizures.  In the past she has been treated with phenobarbital and Dilantin, but these were not very effective. Ever since we started VPA she has been sz free. Family history is that brother had seizures.    She has not had an MRI in our program.      EEG 08/2012 indicated frequent interictal activity, mostly generalized, and frequent brief episodes of eye flutter which were clinically not detected but were seen on video.  She continues to be right at the edge of having seizures.     EEG 11/1/22- background normal but shows generalized polyspikes and waves of less than 2 sec.     She is in menopause .     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 Marriage is stable.  REVIEW OF SYSTEMS:  Except for L knee pain completely negative in 13 points.      EXAMINATION:    She is cheerful, alert, well groomed and very polite.      Speech is very fluent and articulate with no difficulty with word finding.  Indeed, she even corrected my grammar at one point.      Extraocular movements are full.  Facial movements are symmetrical.  Hearing is intact.  Tongue and palate move normally.     Minimal tremor of hands.  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 She failed generic in past; brand medically necessary. 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 She needs to continue VPA but her level was clibing and was over 100 last check. Recent level 11 Oct 23 was 80. Will continue VPA to 250- two bid. Her personal therapeutic range is 80 micrograms/ml total           PLAN:        1.  Continue Brand Depakote 250    1 Am 2 PM  2.  Return in 10 mo.      Time:  5 min pre visit   results review; face to face 24  min discussing EEG, personal VPA level and plan to lower VPA in future as she gets older to keep level in 80s range; post visit charting 3 min.

## 2024-01-04 DIAGNOSIS — G40.309 GENERALIZED CONVULSIVE EPILEPSY (H): ICD-10-CM

## 2024-01-09 DIAGNOSIS — G40.309 GENERALIZED CONVULSIVE EPILEPSY (H): ICD-10-CM

## 2024-01-09 RX ORDER — DIVALPROEX SODIUM 250 MG
500 TABLET, EXTENDED RELEASE 24 HR ORAL 2 TIMES DAILY
Qty: 120 TABLET | Refills: 1 | Status: SHIPPED | OUTPATIENT
Start: 2024-01-09 | End: 2024-01-17

## 2024-01-09 NOTE — TELEPHONE ENCOUNTER
DEPAKOTE  MG TABLET   Last Written Prescription Date:  11/15/2022  Last Fill Quantity: 360,   # refills: 3  Last Office Visit : 10/24/2023  Future Office visit:  10/25/2024  Please confirm dose and send new order.   In 2 different places in last note it is different dosing for Pt care.   Is it 250 Mg,  2 Tabs in the AM and 2 Tabs in the PM???   OR is it 250 Mg's 1 AM and 250 Mg's in the PM.     Please review and send new order.   Thank you     Shannan Kirby RN  Central Triage Red Flags/Med Refills    10/24/2023 Dr. 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 She failed generic in past; brand medically necessary. 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 She needs to continue VPA but her level was clibing and was over 100 last check. Recent level 11 Oct 23 was 80. Will continue VPA to 250- two bid. Her personal therapeutic range is 80 micrograms/ml total           PLAN:        1.  Continue Brand Depakote 250    1 Am 2 PM  2.  Return in 10 mo.      Time:  5 min pre visit   results review; face to face 24  min discussing EEG, personal VPA level and plan to lower VPA in future as she gets older to keep level in 80s range; post visit charting 3 min.           Instructions      Return in about 1 year (around 10/24/2024).

## 2024-01-11 NOTE — TELEPHONE ENCOUNTER
Annia Eng is calling to clarify how her prescription for Depakote is dispensed. Patient states that she typically received yearly supplies and would like to continue to receive script as such.

## 2024-01-17 ENCOUNTER — TELEPHONE (OUTPATIENT)
Dept: NEUROLOGY | Facility: CLINIC | Age: 59
End: 2024-01-17

## 2024-01-17 DIAGNOSIS — G40.309 GENERALIZED CONVULSIVE EPILEPSY (H): ICD-10-CM

## 2024-01-17 RX ORDER — DIVALPROEX SODIUM 250 MG
500 TABLET, EXTENDED RELEASE 24 HR ORAL 2 TIMES DAILY
Qty: 360 TABLET | Refills: 1 | OUTPATIENT
Start: 2024-01-17

## 2024-01-17 RX ORDER — DIVALPROEX SODIUM 250 MG
500 TABLET, EXTENDED RELEASE 24 HR ORAL 2 TIMES DAILY
Qty: 360 TABLET | Refills: 3 | Status: SHIPPED | OUTPATIENT
Start: 2024-01-17

## 2024-01-17 NOTE — TELEPHONE ENCOUNTER
Annia is calling because the rx for her depakote  needs to be change so that she can receive 3 months at a time, please advise  [facility] [place]

## 2024-05-07 DIAGNOSIS — G40.309 GENERALIZED CONVULSIVE EPILEPSY (H): ICD-10-CM

## 2024-05-07 NOTE — TELEPHONE ENCOUNTER
Pt is needs Brand name for the DEPAKOTE  MG 24 hr tablet. Pharmacy needs ok from  To give the brand name. Pt changed insurance. Pt has two days left of medication.

## 2024-05-08 ENCOUNTER — TELEPHONE (OUTPATIENT)
Dept: PSYCHIATRY | Facility: CLINIC | Age: 59
End: 2024-05-08

## 2024-05-08 NOTE — TELEPHONE ENCOUNTER
Prior Authorization Retail Medication Request    Medication/Dose: DEPAKOTE  MG 24 hr tablet  Name brand  Diagnosis and ICD code (if different than what is on RX):  G40.309  New/renewal/insurance change PA/secondary ins. PA:  Previously Tried and Failed:  see chart  Rationale:      Insurance   Primary:   Insurance ID:      Secondary (if applicable):  Insurance ID:      Pharmacy Information (if different than what is on RX)  [id number] [place] [address]     Phone:  [phone]   Fax: [phone]

## 2024-05-08 NOTE — TELEPHONE ENCOUNTER
Retail Pharmacy Prior Authorization Team   Phone: [phone]    PA Initiation    Medication: DEPAKOTE  MG PO TB24  Insurance Company: [facility] - Phone [phone] Fax [phone]  Ph[facility] the [id number] [place] [facility] - [place], [place] - [address] [place] [address] [address]  Filling Pharmacy Phone: [phone]  Filling Pharmacy Fax:    Start Date: 5/8/2024

## 2024-05-08 NOTE — TELEPHONE ENCOUNTER
Prior Authorization Approval    Medication: DEPAKOTE  MG PO TB24  Authorization Effective Date: 4/8/2024  Authorization Expiration Date: 5/8/2025  Insurance Company: [employer] [place] - Phone [phone] Fax [phone]  Which [facility] is filling [id number] [place] [address]  Pharmacy Notified: YES  Patient Notified: YES (faxed approval letter to pharmacy and notified patient via fav.or.itt message)

## 2024-05-14 ENCOUNTER — TELEPHONE (OUTPATIENT)
Dept: NEUROLOGY | Facility: CLINIC | Age: 59
End: 2024-05-14

## 2024-05-14 RX ORDER — DIVALPROEX SODIUM 250 MG
500 TABLET, EXTENDED RELEASE 24 HR ORAL 2 TIMES DAILY
Qty: 360 TABLET | Refills: 3 | OUTPATIENT
Start: 2024-05-14

## 2024-05-14 NOTE — TELEPHONE ENCOUNTER
What is the concern that needs to be addressed by a nurse?     Patient is going to [place] for 10 days and would like to know if she should change her medication times because of the time zone    May a detailed message be left on voicemail? yes    Date of last office visit: 10-24-23    Message routed to: rn pool

## 2024-05-14 NOTE — TELEPHONE ENCOUNTER
Call returned to Annia.  I confirmed she is currently taking Depakote  mg (250 mg x2) twice daily (6 am and 9 pm)    We discussed taking the second dose on day of departure at 6pm CST, then at ~9 am BST  She can then resume taking breakfast and 9 pm local time.  On day of return, she should take the breakfast dose as usual, and the second dose at 11am CST (which will be about 1-2 hours before landing in [facility].  She can then resume breakfast and 9pm CST ([place] Local time)  We discussed that there would actually be an extra dose taken over the 10 day period, however with the medication being an extended release preparation, and the changes in sleep patterns, it would be better to err on the side of a slightly high level vs. a low level due to a missed dose..      We discussed that health care systems and standard of care throughout the world are different.  She should make sure to understand how to access care in the country(ies) she plans to visit.  We discussed taking at least a 2 week medications supply for the 10 day trip, and to keep the pills in the bottles they came in from the pharmacy.  We discussed that not all the medications available in the [place] are available in other countries, and prescriptions from a [place] provider are not valid in other countries.  She should follow all border crossing rules when travelling with medications.     With differences in food hygiene, it is easy to have issues with diarrhea or vomiting. We discussed making sure to keep hydrated and to plan for anti-diarrheal treatments (talk with PCP for this)    No other questions at this time    Patient was instructed to call if questions or concerns arise. I explained that due to security restrictions, her MyChart may not work in the country she is staying in.

## 2024-07-07 ENCOUNTER — HEALTH MAINTENANCE LETTER (OUTPATIENT)
Age: 59
End: 2024-07-07

## 2024-10-25 ENCOUNTER — OFFICE VISIT (OUTPATIENT)
Dept: NEUROLOGY | Facility: CLINIC | Age: 59
End: 2024-10-25
Payer: COMMERCIAL

## 2024-10-25 VITALS
TEMPERATURE: 98.2 F | SYSTOLIC BLOOD PRESSURE: 173 MMHG | OXYGEN SATURATION: 98 % | DIASTOLIC BLOOD PRESSURE: 85 MMHG | HEART RATE: 100 BPM | RESPIRATION RATE: 18 BRPM

## 2024-10-25 DIAGNOSIS — G40.309 GENERALIZED CONVULSIVE EPILEPSY (H): ICD-10-CM

## 2024-10-25 RX ORDER — DIVALPROEX SODIUM 250 MG
500 TABLET, EXTENDED RELEASE 24 HR ORAL 2 TIMES DAILY
Qty: 360 TABLET | Refills: 3 | Status: SHIPPED | OUTPATIENT
Start: 2024-10-25

## 2024-10-25 ASSESSMENT — PAIN SCALES - GENERAL: PAINLEVEL_OUTOF10: NO PAIN (0)

## 2024-10-25 NOTE — PROGRESS NOTES
Annia Eng is a 54 year old female who is being evaluated in clinic      INTERVAL HX: No  GTsz since 2008; no side effects.  EEG 2023 = gen discharges and eye fluttering     SEIZURE HISTORY REVIEWED 10/16/2024:  She has been at [facility] since 2008.   Seizure onset at age 5.  She has had generalized tonic-clonic seizures and absence eye fluttering seizures.  In the past she has been treated with phenobarbital and Dilantin, but these were not very effective. Ever since we started VPA she has been sz free. Family history is that brother had seizures.    She has not had an MRI in our program.      EEG 08/2012 indicated frequent interictal activity, mostly generalized, and frequent brief episodes of eye flutter which were clinically not detected but were seen on video.  She continues to be right at the edge of having seizures.     EEG 11/1/22- background normal but shows generalized polyspikes and waves of less than 2 sec.     She is in menopause .     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 Marriage is stable.  REVIEW OF SYSTEMS:  Except for L knee pain completely negative in 13 points.      EXAMINATION:   BP (!) 173/85 (BP Location: Right arm, Patient Position: Sitting, Cuff Size: Adult Regular)   Pulse 100   Temp 98.2  F (36.8  C) (Temporal)   Resp 18   LMP 08/10/2016 (Approximate)   SpO2 98%      She is cheerful, alert, well groomed and very polite.      Speech is very fluent and articulate with no difficulty with word finding.  Indeed, she even corrected my grammar at one point.      Extraocular movements are full.  Facial movements are symmetrical.  Hearing is intact.  Tongue and palate move normally.     Minimal tremor of hands. 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 She failed generic iduring 2008 when she had 10 min GTSZ and VPA level was 53. Her personal therapeutic level is 80-90 so breaktrough on generic leesa level too low. Last VPA leve in 2022 was 88.; brand medically necessary. 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 She needs to continue VPA but her level was clibing and was over 100 last check. Recent level 11 Oct 23 was 80. Will continue VPA to 250- two bid. Her personal therapeutic range is 80-90 micrograms/ml total .  BRAND MEDICALLY NECESSARY         PLAN:        1.  Continue Brand Depakote 250    2 Am 2 PM  2.  Return in 10 mo.   3. Check BP at home     Time:  8  min pre visit     review of 2008 ER visit ; face to face 30  min discussing EEG, personal VPA level and plan to lower VPA in future as she gets older to keep level in 80s range; post visit charting 3 min.

## 2024-10-25 NOTE — LETTER
10/25/2024       RE: Annia Eng  : 1965   MRN: [id number]      Dear Colleague,    Thank you for referring your patient, Annia Eng, to the [facility] EPILEPSY CARE at [facility]. Please see a copy of my visit note below.    Annia Eng is a 54 year old female who is being evaluated in clinic      INTERVAL HX: No  GTsz since ; no side effects.  EEG  = gen discharges and eye fluttering     SEIZURE HISTORY REVIEWED 10/16/2024:  She has been at [facility] since .   Seizure onset at age 5.  She has had generalized tonic-clonic seizures and absence eye fluttering seizures.  In the past she has been treated with phenobarbital and Dilantin, but these were not very effective. Ever since we started VPA she has been sz free. Family history is that brother had seizures.    She has not had an MRI in our program.      EEG 2012 indicated frequent interictal activity, mostly generalized, and frequent brief episodes of eye flutter which were clinically not detected but were seen on video.  She continues to be right at the edge of having seizures.     EEG 22- background normal but shows generalized polyspikes and waves of less than 2 sec.     She is in menopause .     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 Marriage is stable.  REVIEW OF SYSTEMS:  Except for L knee pain completely negative in 13 points.      EXAMINATION:   BP (!) 173/85 (BP Location: Right arm, Patient Position: Sitting, Cuff Size: Adult Regular)   Pulse 100   Temp 98.2  F (36.8  C) (Temporal)   Resp 18   LMP 08/10/2016 (Approximate)   SpO2 98%      She is cheerful, alert, well groomed and very polite.      Speech is very fluent and articulate with no difficulty with word finding.  Indeed, she even corrected my grammar at one point.      Extraocular movements are full.  Facial movements  are symmetrical.  Hearing is intact.  Tongue and palate move normally.     Minimal tremor of hands. 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 She failed generic iduring 2008 when she had 10 min GTSZ and VPA level was 53. Her personal therapeutic level is 80-90 so breaktrough on generic leesa level too low. Last VPA leve in 2022 was 88.; brand medically necessary. 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 She needs to continue VPA but her level was clibing and was over 100 last check. Recent level 11 Oct 23 was 80. Will continue VPA to 250- two bid. Her personal therapeutic range is 80-90 micrograms/ml total .  BRAND MEDICALLY NECESSARY         PLAN:        1.  Continue Brand Depakote 250    2 Am 2 PM  2.  Return in 10 mo.   3. Check BP at home     Time:  8  min pre visit     review of 2008 ER visit ; face to face 30  min discussing EEG, personal VPA level and plan to lower VPA in future as she gets older to keep level in 80s range; post visit charting 3 min.      Again, thank you for allowing me to participate in the care of your patient.      Sincerely,    Jony Hawkins MD

## 2024-11-21 ENCOUNTER — TELEPHONE (OUTPATIENT)
Dept: NEUROLOGY | Facility: CLINIC | Age: 59
End: 2024-11-21

## 2024-11-21 NOTE — TELEPHONE ENCOUNTER
Patient is calling because her OB GYN wants to put her on an IUD (progesterone, Mirena) to help prevent simple hyperplasia with  out atypia, and she would like to know will this effect the seizure medications

## 2024-11-25 ENCOUNTER — TELEPHONE (OUTPATIENT)
Dept: NEUROLOGY | Facility: CLINIC | Age: 59
End: 2024-11-25

## 2024-11-25 NOTE — TELEPHONE ENCOUNTER
Incoming call from patient, Annia's gynecologist wants her to start an IUD and she wants to make sure that will not affect her Epilepsy.     Annia requesting a call back at [phone].

## 2024-11-26 NOTE — TELEPHONE ENCOUNTER
Hi- An IUD will not change her epilepsy. But does she need one at her age. ILO     Call placed to patient to verbalize this information. Annia had no further questions.

## 2025-07-13 ENCOUNTER — HEALTH MAINTENANCE LETTER (OUTPATIENT)
Age: 60
End: 2025-07-13